# Patient Record
Sex: MALE | Race: WHITE | NOT HISPANIC OR LATINO | Employment: OTHER | URBAN - METROPOLITAN AREA
[De-identification: names, ages, dates, MRNs, and addresses within clinical notes are randomized per-mention and may not be internally consistent; named-entity substitution may affect disease eponyms.]

---

## 2023-08-02 LAB — PROSTATE SPECIFIC AG (NG/ML) IN SER/PLAS: 7.75 NG/ML (ref 0–4)

## 2023-08-20 DIAGNOSIS — K21.9 GASTRO-ESOPHAGEAL REFLUX DISEASE WITHOUT ESOPHAGITIS: ICD-10-CM

## 2023-08-21 RX ORDER — PANTOPRAZOLE SODIUM 40 MG/1
40 TABLET, DELAYED RELEASE ORAL DAILY
Qty: 90 TABLET | Refills: 1 | Status: SHIPPED | OUTPATIENT
Start: 2023-08-21 | End: 2024-05-20 | Stop reason: SDUPTHER

## 2023-09-07 ENCOUNTER — TELEPHONE (OUTPATIENT)
Dept: PRIMARY CARE | Facility: CLINIC | Age: 48
End: 2023-09-07
Payer: COMMERCIAL

## 2023-09-07 ENCOUNTER — LAB (OUTPATIENT)
Dept: LAB | Facility: LAB | Age: 48
End: 2023-09-07
Payer: COMMERCIAL

## 2023-09-07 DIAGNOSIS — R30.0 DYSURIA: ICD-10-CM

## 2023-09-07 DIAGNOSIS — R30.0 DYSURIA: Primary | ICD-10-CM

## 2023-09-07 LAB
APPEARANCE, URINE: CLEAR
BILIRUBIN, URINE: NEGATIVE
BLOOD, URINE: NEGATIVE
COLOR, URINE: YELLOW
GLUCOSE, URINE: NEGATIVE MG/DL
KETONES, URINE: NEGATIVE MG/DL
LEUKOCYTE ESTERASE, URINE: NEGATIVE
NITRITE, URINE: NEGATIVE
PH, URINE: 5 (ref 5–8)
PROTEIN, URINE: NEGATIVE MG/DL
SPECIFIC GRAVITY, URINE: 1.02 (ref 1–1.03)
UROBILINOGEN, URINE: <2 MG/DL (ref 0–1.9)

## 2023-09-07 PROCEDURE — 81003 URINALYSIS AUTO W/O SCOPE: CPT

## 2023-09-07 PROCEDURE — 87086 URINE CULTURE/COLONY COUNT: CPT

## 2023-09-07 NOTE — TELEPHONE ENCOUNTER
Patient is having a burning sensation during urination. Should the patient see a urologist or can something be called in for the patient?

## 2023-09-10 LAB — URINE CULTURE: NORMAL

## 2023-09-26 ENCOUNTER — LAB (OUTPATIENT)
Dept: LAB | Facility: LAB | Age: 48
End: 2023-09-26
Payer: COMMERCIAL

## 2023-09-26 ENCOUNTER — OFFICE VISIT (OUTPATIENT)
Dept: PRIMARY CARE | Facility: CLINIC | Age: 48
End: 2023-09-26
Payer: COMMERCIAL

## 2023-09-26 VITALS
HEART RATE: 68 BPM | RESPIRATION RATE: 16 BRPM | BODY MASS INDEX: 27.52 KG/M2 | TEMPERATURE: 96.9 F | OXYGEN SATURATION: 99 % | HEIGHT: 76 IN | DIASTOLIC BLOOD PRESSURE: 80 MMHG | SYSTOLIC BLOOD PRESSURE: 138 MMHG | WEIGHT: 226 LBS

## 2023-09-26 DIAGNOSIS — Z00.00 HEALTHCARE MAINTENANCE: ICD-10-CM

## 2023-09-26 DIAGNOSIS — Z00.00 HEALTH CARE MAINTENANCE: Primary | ICD-10-CM

## 2023-09-26 PROBLEM — N13.8 BPH WITH OBSTRUCTION/LOWER URINARY TRACT SYMPTOMS: Status: ACTIVE | Noted: 2023-09-26

## 2023-09-26 PROBLEM — M99.09 SEGMENTAL AND SOMATIC DYSFUNCTION: Status: ACTIVE | Noted: 2023-09-26

## 2023-09-26 PROBLEM — R44.8 FACIAL PRESSURE: Status: ACTIVE | Noted: 2023-09-26

## 2023-09-26 PROBLEM — M25.569 KNEE PAIN: Status: ACTIVE | Noted: 2023-09-26

## 2023-09-26 PROBLEM — I83.90 VARICOSE VEIN OF LEG: Status: ACTIVE | Noted: 2023-09-26

## 2023-09-26 PROBLEM — K21.9 GERD (GASTROESOPHAGEAL REFLUX DISEASE): Status: ACTIVE | Noted: 2023-09-26

## 2023-09-26 PROBLEM — J32.0 CHRONIC MAXILLARY SINUSITIS: Status: ACTIVE | Noted: 2023-09-26

## 2023-09-26 PROBLEM — M76.31 ILIOTIBIAL BAND TENDINITIS OF RIGHT SIDE: Status: ACTIVE | Noted: 2023-09-26

## 2023-09-26 PROBLEM — R61 EXCESSIVE SWEATING: Status: ACTIVE | Noted: 2023-09-26

## 2023-09-26 PROBLEM — J32.2 CHRONIC ETHMOIDAL SINUSITIS: Status: ACTIVE | Noted: 2023-09-26

## 2023-09-26 PROBLEM — M24.559 HIP FLEXOR TENDON TIGHTNESS: Status: ACTIVE | Noted: 2023-09-26

## 2023-09-26 PROBLEM — R97.20 ELEVATED PROSTATE SPECIFIC ANTIGEN (PSA): Status: ACTIVE | Noted: 2023-09-26

## 2023-09-26 PROBLEM — M54.2 CERVICALGIA: Status: ACTIVE | Noted: 2023-09-26

## 2023-09-26 PROBLEM — M77.8 TENDINITIS OF EXTENSOR TENDON OF RIGHT HAND: Status: ACTIVE | Noted: 2023-09-26

## 2023-09-26 PROBLEM — M79.10 MYALGIA: Status: ACTIVE | Noted: 2023-09-26

## 2023-09-26 PROBLEM — M47.816 LUMBAR SPONDYLOSIS: Status: ACTIVE | Noted: 2023-09-26

## 2023-09-26 PROBLEM — E78.5 HYPERLIPIDEMIA: Status: ACTIVE | Noted: 2023-09-26

## 2023-09-26 PROBLEM — N40.1 BPH WITH OBSTRUCTION/LOWER URINARY TRACT SYMPTOMS: Status: ACTIVE | Noted: 2023-09-26

## 2023-09-26 PROBLEM — R07.81 RIB PAIN: Status: ACTIVE | Noted: 2023-09-26

## 2023-09-26 PROBLEM — M25.651 STIFFNESS OF RIGHT HIP, NOT ELSEWHERE CLASSIFIED: Status: ACTIVE | Noted: 2023-09-26

## 2023-09-26 PROBLEM — M54.50 LOW BACK PAIN: Status: ACTIVE | Noted: 2023-09-26

## 2023-09-26 PROBLEM — M89.9 SCAPULAR DYSFUNCTION: Status: ACTIVE | Noted: 2023-09-26

## 2023-09-26 PROBLEM — M79.9 POSTURAL STRAIN: Status: ACTIVE | Noted: 2023-09-26

## 2023-09-26 PROBLEM — M54.6 THORACIC SPINE PAIN: Status: ACTIVE | Noted: 2023-09-26

## 2023-09-26 PROBLEM — R79.89 LOW TESTOSTERONE: Status: ACTIVE | Noted: 2023-09-26

## 2023-09-26 LAB
ALANINE AMINOTRANSFERASE (SGPT) (U/L) IN SER/PLAS: 33 U/L (ref 10–52)
ALBUMIN (G/DL) IN SER/PLAS: 4.5 G/DL (ref 3.4–5)
ALKALINE PHOSPHATASE (U/L) IN SER/PLAS: 43 U/L (ref 33–120)
ANION GAP IN SER/PLAS: 9 MMOL/L (ref 10–20)
ASPARTATE AMINOTRANSFERASE (SGOT) (U/L) IN SER/PLAS: 26 U/L (ref 9–39)
BASOPHILS (10*3/UL) IN BLOOD BY AUTOMATED COUNT: 0.04 X10E9/L (ref 0–0.1)
BASOPHILS/100 LEUKOCYTES IN BLOOD BY AUTOMATED COUNT: 0.7 % (ref 0–2)
BILIRUBIN TOTAL (MG/DL) IN SER/PLAS: 1 MG/DL (ref 0–1.2)
CALCIUM (MG/DL) IN SER/PLAS: 9 MG/DL (ref 8.6–10.3)
CARBON DIOXIDE, TOTAL (MMOL/L) IN SER/PLAS: 31 MMOL/L (ref 21–32)
CHLORIDE (MMOL/L) IN SER/PLAS: 103 MMOL/L (ref 98–107)
CHOLESTEROL (MG/DL) IN SER/PLAS: 250 MG/DL (ref 0–199)
CHOLESTEROL IN HDL (MG/DL) IN SER/PLAS: 62.3 MG/DL
CHOLESTEROL/HDL RATIO: 4
CREATININE (MG/DL) IN SER/PLAS: 1.08 MG/DL (ref 0.5–1.3)
EOSINOPHILS (10*3/UL) IN BLOOD BY AUTOMATED COUNT: 0.12 X10E9/L (ref 0–0.7)
EOSINOPHILS/100 LEUKOCYTES IN BLOOD BY AUTOMATED COUNT: 2 % (ref 0–6)
ERYTHROCYTE DISTRIBUTION WIDTH (RATIO) BY AUTOMATED COUNT: 12.5 % (ref 11.5–14.5)
ERYTHROCYTE MEAN CORPUSCULAR HEMOGLOBIN CONCENTRATION (G/DL) BY AUTOMATED: 32.6 G/DL (ref 32–36)
ERYTHROCYTE MEAN CORPUSCULAR VOLUME (FL) BY AUTOMATED COUNT: 97 FL (ref 80–100)
ERYTHROCYTES (10*6/UL) IN BLOOD BY AUTOMATED COUNT: 5 X10E12/L (ref 4.5–5.9)
GFR MALE: 83 ML/MIN/1.73M2
GLUCOSE (MG/DL) IN SER/PLAS: 93 MG/DL (ref 74–99)
HEMATOCRIT (%) IN BLOOD BY AUTOMATED COUNT: 48.4 % (ref 41–52)
HEMOGLOBIN (G/DL) IN BLOOD: 15.8 G/DL (ref 13.5–17.5)
IMMATURE GRANULOCYTES/100 LEUKOCYTES IN BLOOD BY AUTOMATED COUNT: 0.3 % (ref 0–0.9)
LDL: 177 MG/DL (ref 0–99)
LEUKOCYTES (10*3/UL) IN BLOOD BY AUTOMATED COUNT: 5.9 X10E9/L (ref 4.4–11.3)
LYMPHOCYTES (10*3/UL) IN BLOOD BY AUTOMATED COUNT: 1.61 X10E9/L (ref 1.2–4.8)
LYMPHOCYTES/100 LEUKOCYTES IN BLOOD BY AUTOMATED COUNT: 27.1 % (ref 13–44)
MONOCYTES (10*3/UL) IN BLOOD BY AUTOMATED COUNT: 0.47 X10E9/L (ref 0.1–1)
MONOCYTES/100 LEUKOCYTES IN BLOOD BY AUTOMATED COUNT: 7.9 % (ref 2–10)
NEUTROPHILS (10*3/UL) IN BLOOD BY AUTOMATED COUNT: 3.68 X10E9/L (ref 1.2–7.7)
NEUTROPHILS/100 LEUKOCYTES IN BLOOD BY AUTOMATED COUNT: 62 % (ref 40–80)
PLATELETS (10*3/UL) IN BLOOD AUTOMATED COUNT: 256 X10E9/L (ref 150–450)
POTASSIUM (MMOL/L) IN SER/PLAS: 4.3 MMOL/L (ref 3.5–5.3)
PROTEIN TOTAL: 7.2 G/DL (ref 6.4–8.2)
SODIUM (MMOL/L) IN SER/PLAS: 139 MMOL/L (ref 136–145)
THYROTROPIN (MIU/L) IN SER/PLAS BY DETECTION LIMIT <= 0.05 MIU/L: 1.01 MIU/L (ref 0.44–3.98)
TRIGLYCERIDE (MG/DL) IN SER/PLAS: 54 MG/DL (ref 0–149)
UREA NITROGEN (MG/DL) IN SER/PLAS: 20 MG/DL (ref 6–23)
VLDL: 11 MG/DL (ref 0–40)

## 2023-09-26 PROCEDURE — 84443 ASSAY THYROID STIM HORMONE: CPT

## 2023-09-26 PROCEDURE — 1036F TOBACCO NON-USER: CPT | Performed by: INTERNAL MEDICINE

## 2023-09-26 PROCEDURE — 99396 PREV VISIT EST AGE 40-64: CPT | Performed by: INTERNAL MEDICINE

## 2023-09-26 PROCEDURE — 36415 COLL VENOUS BLD VENIPUNCTURE: CPT

## 2023-09-26 PROCEDURE — 93000 ELECTROCARDIOGRAM COMPLETE: CPT | Performed by: INTERNAL MEDICINE

## 2023-09-26 PROCEDURE — 80061 LIPID PANEL: CPT

## 2023-09-26 PROCEDURE — 80053 COMPREHEN METABOLIC PANEL: CPT

## 2023-09-26 PROCEDURE — 85025 COMPLETE CBC W/AUTO DIFF WBC: CPT

## 2023-09-26 ASSESSMENT — ENCOUNTER SYMPTOMS
DIARRHEA: 0
ABDOMINAL PAIN: 0
BLOOD IN STOOL: 0
CONSTIPATION: 0
FREQUENCY: 0
ROS GI COMMENTS: POSITIVE FOR HEARTBURN.

## 2023-09-26 ASSESSMENT — PATIENT HEALTH QUESTIONNAIRE - PHQ9
SUM OF ALL RESPONSES TO PHQ9 QUESTIONS 1 AND 2: 0
2. FEELING DOWN, DEPRESSED OR HOPELESS: NOT AT ALL
1. LITTLE INTEREST OR PLEASURE IN DOING THINGS: NOT AT ALL

## 2023-09-26 NOTE — PROGRESS NOTES
Patient here for a physical - declined chaperone     Subjective   Patient ID: Thanh Fulton is a 48 y.o. male who presents for Annual Exam.    The patient believes that the ongoing excessive sweating may be related to eating late at night.  He denies any chest pain or chest pressure.    The patient reports small callouses on the skin.    The patient mentions occasional bilateral tinnitus but denies any hearing impairment.  He believes the symptoms are tolerable for now and does not think Audiometry is necessary as yet.    The patient notes occasional heartburn due to drinking red wine.    The patient follows regularly with a Dentist, and reports poor to average sleep quality.  The patient denies any vision changes, abdominal pain, hematochezia, melena, bowel problems, or significant urinary symptoms.     The patient recently returned from a trip to [place].       Review of Systems   HENT:  Positive for tinnitus. Negative for hearing loss.    Eyes:  Negative for visual disturbance.   Cardiovascular:  Negative for chest pain.   Gastrointestinal:  Negative for abdominal pain, blood in stool, constipation and diarrhea.        Positive for heartburn.   Genitourinary:  Negative for frequency.   Skin:         Positive for skin callouses.     Objective   Physical Exam  Constitutional:       Appearance: Normal appearance.   Neck:      Vascular: No carotid bruit.   Cardiovascular:      Rate and Rhythm: Normal rate and regular rhythm.      Heart sounds: Normal heart sounds.   Pulmonary:      Effort: Pulmonary effort is normal.      Breath sounds: Normal breath sounds.   Abdominal:      General: Bowel sounds are normal.      Palpations: Abdomen is soft.      Tenderness: There is no abdominal tenderness.   Skin:     General: Skin is warm and dry.   Neurological:      General: No focal deficit present.      Mental Status: He is alert and oriented to person, place, and time. Mental status is at baseline.   Psychiatric:          Mood and Affect: Mood normal.         Behavior: Behavior normal.       Assessment/Plan   Problem List Items Addressed This Visit    None  Visit Diagnoses         Codes    Health care maintenance    -  Primary Z00.00    Relevant Orders    ECG 12 lead (Clinic Performed) (Completed)    Healthcare maintenance     Z00.00    Relevant Orders    Lipid panel    Comprehensive metabolic panel    CBC and Auto Differential    Tsh With Reflex To Free T4 If Abnormal            CPE Performed  -  Discussed healthy diet and regular exercise.    -  Physical exam overall unremarkable. Immunizations reviewed and updated accordingly. Healthy lifestyle choices discussed (tobacco avoidance, appropriate alcohol use, avoidance of illicit substances).   -  Patient is wearing seatbelt.   -  Screening lab work ordered as indicated.    -  Age appropriate screening tests reviewed with patient.        EKG unremarkable compared to previous.     IMPRESSION/PLAN:     /80 in the office today.    HLD   - Cholesterol and LDL borderline high per 11/2021 lipid panel, patient advised to continue to lower levels through healthy diet and regular physical activity.      GERD   - symptoms managed with pantoprazole 40mg QD. EGD 5/2022 unremarkable.     Elevated PSA  - Last PSA elevated at 4.58 9/2022.  s/p biopsy 1/2023 showed no malignancy.  Following with  from Urology.    FHx of Heart Disease   - CT Cardiac Score 0 per 11/2022.     Knee Pain   - Xray 9/2022 showed mild osteoarthritis in the right knee.     Excessive Sweating   - Last TSH 11/2021 wnl. Patient mentions symptoms appear when he eats later in the evening, and advised he eat earlier to avoid triggering the condition. Will monitor for now, and advised to call the office if symptoms persist or worsen.    Health Maintenance   - Routine labs ordered including CBC, CMP, and a lipid panel to be completed in the fasting state.  Added TSH.  Last colonoscopy 5/2022, repeat due 5/2027.  Advised the patient to receive his Shingrix series vaccine through the pharmacy, and discussed adverse effects.     Follow up for annual physical examination, call sooner if needed.        Scribe Attestation  By signing my name below, I, Nayeli Sorto   attest that this documentation has been prepared under the direction and in the presence of Dmitriy Balbuena DO.

## 2023-12-07 ENCOUNTER — ALLIED HEALTH (OUTPATIENT)
Dept: INTEGRATIVE MEDICINE | Facility: CLINIC | Age: 48
End: 2023-12-07
Payer: COMMERCIAL

## 2023-12-07 DIAGNOSIS — M53.3 SACRAL BACK PAIN: ICD-10-CM

## 2023-12-07 DIAGNOSIS — M99.05 SEGMENTAL AND SOMATIC DYSFUNCTION OF PELVIC REGION: ICD-10-CM

## 2023-12-07 DIAGNOSIS — M25.551 PAIN OF RIGHT HIP: ICD-10-CM

## 2023-12-07 DIAGNOSIS — M99.04 SEGMENTAL AND SOMATIC DYSFUNCTION OF SACRAL REGION: ICD-10-CM

## 2023-12-07 DIAGNOSIS — M79.10 MYALGIA: ICD-10-CM

## 2023-12-07 DIAGNOSIS — M99.02 SEGMENTAL AND SOMATIC DYSFUNCTION OF THORACIC REGION: Primary | ICD-10-CM

## 2023-12-07 DIAGNOSIS — G89.29 CHRONIC RIGHT-SIDED THORACIC BACK PAIN: ICD-10-CM

## 2023-12-07 DIAGNOSIS — M99.03 SEGMENTAL AND SOMATIC DYSFUNCTION OF LUMBAR REGION: ICD-10-CM

## 2023-12-07 DIAGNOSIS — M79.9 POSTURAL STRAIN: ICD-10-CM

## 2023-12-07 DIAGNOSIS — M54.6 CHRONIC RIGHT-SIDED THORACIC BACK PAIN: ICD-10-CM

## 2023-12-07 PROCEDURE — 98941 CHIROPRACT MANJ 3-4 REGIONS: CPT | Performed by: CHIROPRACTOR

## 2023-12-07 PROCEDURE — 97140 MANUAL THERAPY 1/> REGIONS: CPT | Performed by: CHIROPRACTOR

## 2023-12-07 ASSESSMENT — ENCOUNTER SYMPTOMS
SHORTNESS OF BREATH: 0
DIZZINESS: 0
CONFUSION: 0
APPETITE CHANGE: 0
FEVER: 0
WOUND: 0
NAUSEA: 0
VOMITING: 0
ROS GI COMMENTS: GERD
FATIGUE: 0
DYSURIA: 0
WEAKNESS: 0

## 2023-12-07 NOTE — PROGRESS NOTES
Subjective   Patient ID: Thanh Fulton is a 48 y.o. male who presents today for the examination and treatment of pain involving their back pain.    This is visit 5/20 of the calendar year.    Fall risk: None. No falls in the last 6 months.     HPI -has not been in for treatment in several months.  Since then he has traveled for 3 separate trips.  He also reports that he has been extremely busy with work but is trying to reprioritize his health.  He recognizes that he has not been exercising or stretching is much as he should.  Today he would like to focus treatment on the right mid scapular region and also check his lower back and hips.  He will have right-sided IT band pain most notably after sleeping and likely due to sleeping postures.    Patient describes the pain as achiness, stiffness, tightness, and nagging, and rates the current pain 6 out of 10 (10 being the worst).     Last treatment visit 4/24/23: The right thoracic pain has been significantly improved. However he is a little more cautious and has not been doing benchpress as often or as intensely. Today he is having some soreness and tightness in the right lateral hip (TTB) and the right SI joint. We will focus on soft tissue treatment here and check full spine for any additional restrictions or imbalances.     01/06/2023:Thanh presents today for the first time since March 2022. There are no current acute pain issues but he is feeling some increased stiffness in the lower back especially when standing for long period of time such as when he is doing dishes. He also is complaining of soreness in the cervical thoracic region especially when at his computer desk for prolonged period of time as well. We discussed some adjustments to his posture and also showed him the standing downward dog stretch for opening the chest shoulders and encouraging thoracic extension. Today we will check full spine for any restrictions or stiffness.     INITIAL ANNAMARIA 09/01/20:  Thanh presents today for his chief complaint of middle to low back pain of about 1 year in duration. He rates the pain as a 3 out of 10 (10 being the worst imaginable pain). He notes that the pain isn't intense and he describes it more as a discomfort. He presented to his PCP who referred him to Dr. Mayo a couple of months ago with his back pain complaint and Dr. Mayo sent him for an x-ray that was negative. He was then sent to physical therapy. He notes that the physical therapy has helped, however, he is still having some stiffness, tightness and soreness in the muscles. He notes that physical therapy then sent him to get massage therapy. He saw Andrea, massage therapist, last week. He notes that the massage helped relief the pain; stretching has also helped.      Thanh notes that he is pretty active and is planning to play basketball about twice a week moving forward. He owns an insurance company and he notes that he does a good job at standing up and walking while working on his computer.      Review of Systems   Constitutional:  Negative for appetite change, fatigue and fever.   HENT:  Negative for congestion and hearing loss.    Eyes:  Negative for visual disturbance.   Respiratory:  Negative for shortness of breath.    Cardiovascular:  Negative for chest pain.   Gastrointestinal:  Negative for nausea and vomiting.        GERD   Genitourinary:  Negative for dysuria.        BPH with obstruction/lower urinary tract symptoms (elevated PSA)   Skin:  Negative for rash and wound.   Neurological:  Negative for dizziness and weakness.   Psychiatric/Behavioral:  Negative for confusion.    All other systems reviewed and are negative.    Objective   Observation : normal gait and normal posture. Tall.     Physical Exam  Constitutional:       General: He is not in acute distress.  Musculoskeletal:         General: No signs of injury.   Neurological:      Mental Status: He is oriented to person, place, and time.       Sensory: Sensation is intact.      Motor: Motor function is intact.      Coordination: Coordination is intact.      Gait: Gait is intact.   Psychiatric:         Mood and Affect: Mood normal.     Examination findings (palpation & ROM): Tenderness, hypertonicity and trigger points palpated throughout the right thoracic paraspinals, right rhomboids, right middle and lower trapezius, bilateral lumbar paraspinals and gluteals.  Right hip flexor tightness and right IT band tightness comparatively to the left.    Segmental joint dysfunction was identified in the following areas using motion palpation and/or pain provocation assessment:  Cervical: ---  Thoracic: T4 -T8, T11 andT12.   Lumbar: L1-L5.   Sacrum: L5/SI and Right SI.     Today's treatment:  Performed spinal manipulation to the regions of segmental dysfunction identified on examination using age-appropriate and injury specific force, and manual diversified technique. Technique Used: diversified CMT~and~pelvic drop table technique.       Manual Therapy (75718), Time In: 10:20 AM, Time Out: 10:35 AM, 1 Units. Ischemic compression, IASTM/Graston and Soft-tissue manipulation. Brief supine MFR to the neck, upper back and shoulders. Prone and seated IC, MFR and IASTM to R rhomboids, thoracic/lumbar paraspinals, QL and lats.     It was advised that he uses foam roller for thoracic mobility and also try sleeping with a pillow between his knees for better sacral positioning.    Treatment Plan:   The patient and I discussed the risks and benefits of chiropractic care. Based on the patient's subjective complaints along with the examination findings, it is advised that a course of chiropractic treatment by initiated. Consent for care was given both written and orally by the patient. The patient tolerated today's treatment with little or no additional discomfort and was instructed to contact the office for questions or concerns.     Treatment Frequency: Will see/treat patient  every 2-4 weeks as therapeutic benefit is sustained, then frequency will be reduced to as needed for symptom management or as needed for acute flare-ups/exacerbation.     Please note: Voice-to-text software was used when completing this note.  While the note was proofread, portions may include grammatical errors.  Please contact me with any questions/concerns as it relates to these types of errors.

## 2024-02-20 ENCOUNTER — OFFICE VISIT (OUTPATIENT)
Dept: DERMATOLOGY | Facility: CLINIC | Age: 49
End: 2024-02-20
Payer: COMMERCIAL

## 2024-02-20 DIAGNOSIS — D18.01 HEMANGIOMA OF SKIN: ICD-10-CM

## 2024-02-20 DIAGNOSIS — D22.9 MULTIPLE BENIGN NEVI: ICD-10-CM

## 2024-02-20 DIAGNOSIS — L72.0 EPIDERMAL CYST: ICD-10-CM

## 2024-02-20 DIAGNOSIS — L73.9 FOLLICULITIS: Primary | ICD-10-CM

## 2024-02-20 DIAGNOSIS — L57.8 DIFFUSE PHOTODAMAGE OF SKIN: ICD-10-CM

## 2024-02-20 DIAGNOSIS — L81.4 LENTIGO: ICD-10-CM

## 2024-02-20 PROCEDURE — 1036F TOBACCO NON-USER: CPT | Performed by: STUDENT IN AN ORGANIZED HEALTH CARE EDUCATION/TRAINING PROGRAM

## 2024-02-20 PROCEDURE — 99204 OFFICE O/P NEW MOD 45 MIN: CPT | Performed by: STUDENT IN AN ORGANIZED HEALTH CARE EDUCATION/TRAINING PROGRAM

## 2024-02-20 RX ORDER — CLINDAMYCIN PHOSPHATE 10 UG/ML
LOTION TOPICAL DAILY
Qty: 60 ML | Refills: 11 | Status: SHIPPED | OUTPATIENT
Start: 2024-02-20 | End: 2025-02-19

## 2024-02-20 NOTE — PROGRESS NOTES
Subjective   Thanh Fulton is a 48 y.o. male who presents for the following: Skin Check (LV: 3/2/23 with dermatologist Dr. Gardenia Worthy. [facility]. Concerned with raised spot on upper back.) and Suspicious Skin Lesion (Two raised areas on back of scalp that have been present for approximately one year. Concerned with raised areas around mouth.)    Skin Cancer History  No personal history of skin cancer.    Family History of Skin Cancer  No family history of skin cancer.      Objective   Well appearing patient in no apparent distress; mood and affect are within normal limits.    A full examination was performed including scalp, head, eyes, ears, nose, lips, neck, chest, axillae, abdomen, back, buttocks, bilateral upper extremities, bilateral lower extremities, hands, feet, fingers, toes, fingernails, and toenails. All findings within normal limits unless otherwise noted below.    Mottled pigmentation and telangiectasias consistent with sun damage    Scattered cherry-red papule(s).    Scattered tan macules in sun-exposed areas.    Scattered, uniform and benign-appearing, regular brown melanocytic papules and macules.    Neck - Anterior, Scalp  Follicularly-based erythematous papules and pustules.    Mid Back  Round cystic papule with puctum      Assessment/Plan   Folliculitis  Neck - Anterior; Scalp    Reassured of benign, yet possibly chronic and intermittently flaring nature of condition   -Advised to start BPO 10% wash as body wash daily, followed by Clindamycin 1% lotion to affected areas. May d/c Clindamycin once improved, but would continue BPO wash daily as preventative measure -Patient verbalized understanding and agreement with plan.      clindamycin (Cleocin T) 1 % lotion - Neck - Anterior, Scalp  Apply topically once daily.    Diffuse photodamage of skin    Encouraged sun protection with opting for shade when feasible, wearing sun protective clothing, and/or using sunscreen SPF 30+ daily.     Related  Procedures  Follow Up In Dermatology - Established Patient    Epidermal cyst  Mid Back    Reassured of benign nature of lesion. If becomes inflamed, painful, we can pursue excision as desired.   He chose to defer for now     Hemangioma of skin    Reassured of benign nature of lesions    Lentigo    Reassured of benign nature of lesions    Multiple benign nevi    Reassured of benign nature of lesions

## 2024-02-20 NOTE — PATIENT INSTRUCTIONS

## 2024-05-20 DIAGNOSIS — K21.9 GASTRO-ESOPHAGEAL REFLUX DISEASE WITHOUT ESOPHAGITIS: ICD-10-CM

## 2024-05-20 RX ORDER — PANTOPRAZOLE SODIUM 40 MG/1
40 TABLET, DELAYED RELEASE ORAL DAILY
Qty: 90 TABLET | Refills: 1 | Status: SHIPPED | OUTPATIENT
Start: 2024-05-20

## 2024-05-21 ENCOUNTER — OFFICE VISIT (OUTPATIENT)
Dept: OTOLARYNGOLOGY | Facility: CLINIC | Age: 49
End: 2024-05-21
Payer: COMMERCIAL

## 2024-05-21 VITALS — WEIGHT: 221.9 LBS | BODY MASS INDEX: 27.59 KG/M2 | HEIGHT: 75 IN

## 2024-05-21 DIAGNOSIS — J32.8 OTHER CHRONIC SINUSITIS: Primary | ICD-10-CM

## 2024-05-21 DIAGNOSIS — H69.90 DYSFUNCTION OF EUSTACHIAN TUBE, UNSPECIFIED LATERALITY: ICD-10-CM

## 2024-05-21 DIAGNOSIS — J33.9 NASAL POLYP: ICD-10-CM

## 2024-05-21 PROCEDURE — 1036F TOBACCO NON-USER: CPT | Performed by: OTOLARYNGOLOGY

## 2024-05-21 PROCEDURE — 99213 OFFICE O/P EST LOW 20 MIN: CPT | Performed by: OTOLARYNGOLOGY

## 2024-05-21 RX ORDER — MOMETASONE FUROATE 100 %
POWDER (GRAM) MISCELLANEOUS
Qty: 60 G | Refills: 3 | Status: SHIPPED | OUTPATIENT
Start: 2024-05-21

## 2024-05-21 ASSESSMENT — PAIN SCALES - GENERAL: PAINLEVEL: 0-NO PAIN

## 2024-05-21 NOTE — PROGRESS NOTES
"Chief Complaint:    Chronic sinusitis    Referring Provider: No ref. provider found    History of Present Illness:    Thanh Fulton is a 48 y.o. male, presenting for evaluation of chronic sinusitis.  He is a patient of my partner Dr. Gruber who previously performed surgery for chronic rhinosinusitis with nasal polyposis.  He states that lately he has been having some issues with his ears where he intermittently has fullness and inability to equalize pressure.  He states that he noticed this when he was flying recently.  He has an upcoming trip to [place] in June.  He also has intermittent nasal obstruction has a sensation of mucus flying down the back of his throat and neck.  He is not currently taking any sinonasal medications.  No other pertinent history.    ?  Review of Systems:    Review of symptoms was negative except for those stated including Cardiopulmonary, Genitourinary, Gastrointestinal, Psychological, Sleep pattern, Endocrine, Eyes, Neurologic, Musculoskeletal, Skin, Hematologic/Lymphatic and Allergic/Immunologic.     Medical History:    I have reviewed the patient's updated past medical history, surgical history, family history, social history, as well as current medications and allergies as of 5/21/2024. Changes to these items have been updated and marked as reviewed in the electronic medical record.    Physical Exam:    Vitals:  height is 1.905 m (6' 3\") and weight is 101 kg (221 lb 14.4 oz).   General: Patient doing well overall and is in no apparent distress.  Psych: Pleasant affect, and answers questions appropriately.  Head & Face: Symmetric facial movements  Eyes: Pupils equal, round, reactive.  Extraocular movements intact without gaze restrictions or nystagmus. No epiphora.  Ears:  External auditory canals are normal.  Tympanic membranes are clear.  No middle ear effusion is seen.  All middle ear landmarks are normal.  Nose: Anterior rhinoscopy revealed normal sinonasal mucosa. More " posterior areas of the nasal cavity could not be completely examined.  Oral Cavity/Oropharynx:  Without lesions or masses to visual exam.  Neck: Supple without lymphadenopathy.  Lungs: Non-labored, and without evidence of stridor.  Cardiac: Pulses are strong, well-perfused.  Extremities: Without gross evidence of clubbing, cyanosis, or edema.  Neuro: Cranial nerves II-XII grossly intact; Intact facial movements.    Procedure:  Rigid nasal endoscopy (35891)  Pre-procedure diagnosis/Indication for procedure:  To evaluate areas not visualized on anterior rhinoscopy   Anesthesia:  None  Description:  A 0-degree 3-mm rigid nasal endoscope was used to examine the left and right nasal cavities.  The nasal valve areas were examined for abnormalities or collapse.  The inferior and middle turbinates were evaluated.  The middle and superior meatuses, and the sphenoethmoid recesses were examined and inspected for mucopurulence and polyps. Once the endoscope was withdrawn, the patient was noted to have tolerated the procedure well without complications and was returned to ambulatory status.    Findings:    There is polyposis within the ethmoid cavity bilaterally.  There is also thick yellow drainage collecting in the nasopharynx adjacent to both eustachian tubes.  The source of the drainage appears to be the ethmoid cavity bilaterally.  He tolerated the procedure well.      Assessment:     Thanh Fulton is a 48 y.o. male with chronic sinusitis with nasal polyposis and eustachian tube dysfunction.    Plan:      I recommend that he initiate mometasone rinses twice daily bilaterally.  He can use these for 6 to 8 weeks and then we will see him in follow-up to check his progress.  I also advised him to use an oral and/or topical decongestant upon takeoff and landing.  He is in agreement with this plan and will follow-up with us in August when he returns from [place].      [place]bania Barros MD, M.Eng.   of  Otolaryngology - Head & Neck Surgery  Division of Rhinology and Endoscopic Skull Base Surgery  [facility]

## 2024-06-18 ENCOUNTER — APPOINTMENT (OUTPATIENT)
Dept: OTOLARYNGOLOGY | Facility: CLINIC | Age: 49
End: 2024-06-18
Payer: COMMERCIAL

## 2024-08-12 NOTE — PROGRESS NOTES
Chief Complaint:  Follow-up    Referring Provider: No ref. provider found    History of Present Illness:    Thanh Fulton is a 49 y.o. male, presenting for follow-up for chronic sinusitis.  He just recently returned from [place] and states while he was abroad he was not able to use his irrigations or other medications.  He states that despite this he is actually felt somewhat better.  He did not have any issues with pressure equalization when in the airplane.  He presents today with still some lingering sinusitis symptoms but otherwise is feeling well.  ?  Review of Systems:    Review of symptoms was negative except for those stated including Cardiopulmonary, Genitourinary, Gastrointestinal, Psychological, Sleep pattern, Endocrine, Eyes, Neurologic, Musculoskeletal, Skin, Hematologic/Lymphatic and Allergic/Immunologic.     Medical History:    I have reviewed the patient's updated past medical history, surgical history, family history, social history, as well as current medications and allergies as of 8/13/2024. Changes to these items have been updated and marked as reviewed in the electronic medical record.    Physical Exam:    Vitals:  vitals were not taken for this visit.   General: Patient doing well overall and is in no apparent distress.  Psych: Pleasant affect, and answers questions appropriately.  Head & Face: Symmetric facial movements  Eyes: Pupils equal, round, reactive.  Extraocular movements intact without gaze restrictions or nystagmus. No epiphora.  Ears:  External auditory canals are normal.  Tympanic membranes are clear.  No middle ear effusion is seen.  All middle ear landmarks are normal.  Nose: Anterior rhinoscopy revealed normal sinonasal mucosa. More posterior areas of the nasal cavity could not be completely examined.  Oral Cavity/Oropharynx:  Without lesions or masses to visual exam.  Neck: Supple without lymphadenopathy.  Lungs: Non-labored, and without evidence of stridor.  Cardiac: Pulses  are strong, well-perfused.  Extremities: Without gross evidence of clubbing, cyanosis, or edema.  Neuro: Cranial nerves II-XII grossly intact; Intact facial movements.    Assessment:     Thanh Fulton is a 49 y.o. male with chronic rhinitis    Plan:      Continue mometasone rinses and follow-up in 2 to 3 months to recheck his symptoms.      Albania Barros MD, M.Eng.   of Otolaryngology - Head & Neck Surgery  Division of Rhinology and Endoscopic Skull Base Surgery  [facility]

## 2024-08-13 ENCOUNTER — APPOINTMENT (OUTPATIENT)
Dept: OTOLARYNGOLOGY | Facility: CLINIC | Age: 49
End: 2024-08-13
Payer: COMMERCIAL

## 2024-08-13 VITALS — BODY MASS INDEX: 28.11 KG/M2 | WEIGHT: 226.1 LBS | HEIGHT: 75 IN

## 2024-08-13 DIAGNOSIS — J33.9 NASAL POLYP: Primary | ICD-10-CM

## 2024-08-13 DIAGNOSIS — H69.90 DYSFUNCTION OF EUSTACHIAN TUBE, UNSPECIFIED LATERALITY: ICD-10-CM

## 2024-08-13 DIAGNOSIS — J32.8 OTHER CHRONIC SINUSITIS: ICD-10-CM

## 2024-08-13 PROCEDURE — 3008F BODY MASS INDEX DOCD: CPT | Performed by: OTOLARYNGOLOGY

## 2024-08-13 PROCEDURE — 99213 OFFICE O/P EST LOW 20 MIN: CPT | Performed by: OTOLARYNGOLOGY

## 2024-08-13 ASSESSMENT — PATIENT HEALTH QUESTIONNAIRE - PHQ9
2. FEELING DOWN, DEPRESSED OR HOPELESS: NOT AT ALL
1. LITTLE INTEREST OR PLEASURE IN DOING THINGS: NOT AT ALL
SUM OF ALL RESPONSES TO PHQ9 QUESTIONS 1 AND 2: 0

## 2024-08-13 ASSESSMENT — PAIN SCALES - GENERAL: PAINLEVEL: 0-NO PAIN

## 2024-09-24 ENCOUNTER — APPOINTMENT (OUTPATIENT)
Dept: OTOLARYNGOLOGY | Facility: CLINIC | Age: 49
End: 2024-09-24
Payer: COMMERCIAL

## 2024-09-27 ENCOUNTER — APPOINTMENT (OUTPATIENT)
Dept: PRIMARY CARE | Facility: CLINIC | Age: 49
End: 2024-09-27
Payer: COMMERCIAL

## 2024-09-27 VITALS
WEIGHT: 225 LBS | TEMPERATURE: 97.7 F | HEART RATE: 59 BPM | RESPIRATION RATE: 16 BRPM | DIASTOLIC BLOOD PRESSURE: 80 MMHG | HEIGHT: 76 IN | OXYGEN SATURATION: 97 % | BODY MASS INDEX: 27.4 KG/M2 | SYSTOLIC BLOOD PRESSURE: 128 MMHG

## 2024-09-27 DIAGNOSIS — Z00.00 HEALTHCARE MAINTENANCE: Primary | ICD-10-CM

## 2024-09-27 PROCEDURE — 3008F BODY MASS INDEX DOCD: CPT | Performed by: INTERNAL MEDICINE

## 2024-09-27 PROCEDURE — 99396 PREV VISIT EST AGE 40-64: CPT | Performed by: INTERNAL MEDICINE

## 2024-09-27 PROCEDURE — 93000 ELECTROCARDIOGRAM COMPLETE: CPT | Performed by: INTERNAL MEDICINE

## 2024-09-27 PROCEDURE — 1036F TOBACCO NON-USER: CPT | Performed by: INTERNAL MEDICINE

## 2024-09-27 ASSESSMENT — ENCOUNTER SYMPTOMS
ABDOMINAL PAIN: 0
BLOOD IN STOOL: 0
CONSTIPATION: 0
TROUBLE SWALLOWING: 0
SLEEP DISTURBANCE: 1
DIARRHEA: 0

## 2024-09-27 NOTE — PROGRESS NOTES
Patient here for a physical     Subjective   Patient ID: Thanh Fulton is a 49 y.o. male who presents for Annual Exam.    The patient notes that nocturia has improved and occurs only once each evening.  The last PSA was elevated at 7.75 in 8/2023.    The patient is following with  from Otolaryngology for chronic sinusitis.  He has been using mometasone rinses, and finds that this is helping.    The patient suspects mild hearing impairment, and inquires if Audiometry is necessary.  He wears prescription lenses, but denies any vision changes.    The patient reports mild sleep disturbance as compared to baseline, since returning from [place] in early 8/2024.      The patient denies any dysphagia, abdominal pain, hematochezia, melena, or bowel problems.       Review of Systems   HENT:  Positive for hearing loss. Negative for trouble swallowing.    Eyes:  Negative for visual disturbance.   Gastrointestinal:  Negative for abdominal pain, blood in stool, constipation and diarrhea.   Genitourinary:         Positive for nocturia of one time per evening.   Psychiatric/Behavioral:  Positive for sleep disturbance.        Objective   Physical Exam  Constitutional:       Appearance: Normal appearance.   Neck:      Vascular: No carotid bruit.   Cardiovascular:      Rate and Rhythm: Normal rate and regular rhythm.      Heart sounds: Normal heart sounds.   Pulmonary:      Effort: Pulmonary effort is normal.      Breath sounds: Normal breath sounds.   Abdominal:      General: Bowel sounds are normal.      Palpations: Abdomen is soft.      Tenderness: There is no abdominal tenderness.   Skin:     General: Skin is warm and dry.   Neurological:      General: No focal deficit present.      Mental Status: He is alert and oriented to person, place, and time. Mental status is at baseline.   Psychiatric:         Mood and Affect: Mood normal.         Behavior: Behavior normal.         Assessment/Plan   Problem List Items  Addressed This Visit    None  Visit Diagnoses         Codes    Healthcare maintenance    -  Primary Z00.00    Relevant Orders    Lipid panel    Comprehensive metabolic panel    CBC    PSA            CPE Performed  -  Discussed healthy diet and regular exercise.    -  Physical exam overall unremarkable. Immunizations reviewed and updated accordingly. Healthy lifestyle choices discussed (tobacco avoidance, appropriate alcohol use, avoidance of illicit substances).   -  Patient is wearing seatbelt.   -  Screening lab work ordered as indicated.    -  Age appropriate screening tests reviewed with patient.        IMPRESSION/PLAN:     Sleep Disturbance  - Advised patient to try Melatonin when he anticipates jet lag prior to the next trip.  Call the clinic if symptoms persist or worsen.     Hearing Impairment  - Patient would like an order for Audiometry next year in 2025.  Would like to hold off until then.    /80 in the office today.     HLD   - Cholesterol and LDL high 9/2023.  Patient advised to continue to lower levels through healthy diet and regular physical activity.      FHx of Heart Disease   - CT Cardiac Score 0 per 11/2022.    GERD   - symptoms managed with pantoprazole 40mg QD. EGD 5/2022 unremarkable.     Elevated PSA  - Last PSA elevated at 7.75 8/2023.  s/p biopsy 1/2023 showed no malignancy.  Following with  from Urology.      Knee Pain   - Xray 9/2022 showed mild osteoarthritis in the right knee.     Excessive Sweating   - Last TSH 11/2021 wnl. Patient mentions symptoms appear when he eats later in the evening, and advised he eat earlier to avoid triggering the condition. Will monitor for now, and advised to call the office if symptoms persist or worsen.     Health Maintenance   - Routine labs ordered including CBC, CMP, and a lipid panel to be completed in the fasting state. Added PSA. Last PSA wnl 8/2023.  Last colonoscopy 5/2022, repeat due 5/2027.  Advised the patient to receive the  Shingrix series through the pharmacy, and discussed adverse effects.       Follow up for annual physical examination, call sooner if needed.        Scribe Attestation  By signing my name below, I, Nayeli Sorto   attest that this documentation has been prepared under the direction and in the presence of Dmitriy Balbuena DO.   Maru Booth 09/27/24 10:33 AM

## 2024-10-03 ENCOUNTER — LAB (OUTPATIENT)
Dept: LAB | Facility: LAB | Age: 49
End: 2024-10-03
Payer: COMMERCIAL

## 2024-10-03 DIAGNOSIS — Z00.00 HEALTHCARE MAINTENANCE: ICD-10-CM

## 2024-10-03 LAB
ALBUMIN SERPL BCP-MCNC: 4.5 G/DL (ref 3.4–5)
ALP SERPL-CCNC: 53 U/L (ref 33–120)
ALT SERPL W P-5'-P-CCNC: 31 U/L (ref 10–52)
ANION GAP SERPL CALC-SCNC: 13 MMOL/L (ref 10–20)
AST SERPL W P-5'-P-CCNC: 27 U/L (ref 9–39)
BILIRUB SERPL-MCNC: 1.2 MG/DL (ref 0–1.2)
BUN SERPL-MCNC: 14 MG/DL (ref 6–23)
CALCIUM SERPL-MCNC: 9.1 MG/DL (ref 8.6–10.6)
CHLORIDE SERPL-SCNC: 101 MMOL/L (ref 98–107)
CHOLEST SERPL-MCNC: 252 MG/DL (ref 0–199)
CHOLESTEROL/HDL RATIO: 3.8
CO2 SERPL-SCNC: 30 MMOL/L (ref 21–32)
CREAT SERPL-MCNC: 0.95 MG/DL (ref 0.5–1.3)
EGFRCR SERPLBLD CKD-EPI 2021: >90 ML/MIN/1.73M*2
ERYTHROCYTE [DISTWIDTH] IN BLOOD BY AUTOMATED COUNT: 12.1 % (ref 11.5–14.5)
GLUCOSE SERPL-MCNC: 89 MG/DL (ref 74–99)
HCT VFR BLD AUTO: 47.2 % (ref 41–52)
HDLC SERPL-MCNC: 66.9 MG/DL
HGB BLD-MCNC: 15.5 G/DL (ref 13.5–17.5)
LDLC SERPL CALC-MCNC: 164 MG/DL
MCH RBC QN AUTO: 31.7 PG (ref 26–34)
MCHC RBC AUTO-ENTMCNC: 32.8 G/DL (ref 32–36)
MCV RBC AUTO: 97 FL (ref 80–100)
NON HDL CHOLESTEROL: 185 MG/DL (ref 0–149)
NRBC BLD-RTO: 0 /100 WBCS (ref 0–0)
PLATELET # BLD AUTO: 238 X10*3/UL (ref 150–450)
POTASSIUM SERPL-SCNC: 3.9 MMOL/L (ref 3.5–5.3)
PROT SERPL-MCNC: 7.3 G/DL (ref 6.4–8.2)
PSA SERPL-MCNC: 6.46 NG/ML
RBC # BLD AUTO: 4.89 X10*6/UL (ref 4.5–5.9)
SODIUM SERPL-SCNC: 140 MMOL/L (ref 136–145)
TRIGL SERPL-MCNC: 106 MG/DL (ref 0–149)
VLDL: 21 MG/DL (ref 0–40)
WBC # BLD AUTO: 7.1 X10*3/UL (ref 4.4–11.3)

## 2024-10-03 PROCEDURE — 80053 COMPREHEN METABOLIC PANEL: CPT

## 2024-10-03 PROCEDURE — 85027 COMPLETE CBC AUTOMATED: CPT

## 2024-10-03 PROCEDURE — 36415 COLL VENOUS BLD VENIPUNCTURE: CPT

## 2024-10-03 PROCEDURE — 84153 ASSAY OF PSA TOTAL: CPT

## 2024-10-03 PROCEDURE — 80061 LIPID PANEL: CPT

## 2024-10-29 ENCOUNTER — APPOINTMENT (OUTPATIENT)
Dept: OTOLARYNGOLOGY | Facility: CLINIC | Age: 49
End: 2024-10-29
Payer: COMMERCIAL

## 2024-11-29 DIAGNOSIS — K21.9 GASTRO-ESOPHAGEAL REFLUX DISEASE WITHOUT ESOPHAGITIS: ICD-10-CM

## 2024-12-02 RX ORDER — PANTOPRAZOLE SODIUM 40 MG/1
40 TABLET, DELAYED RELEASE ORAL DAILY
Qty: 90 TABLET | Refills: 1 | Status: SHIPPED | OUTPATIENT
Start: 2024-12-02

## 2024-12-10 ENCOUNTER — APPOINTMENT (OUTPATIENT)
Facility: CLINIC | Age: 49
End: 2024-12-10
Payer: COMMERCIAL

## 2024-12-10 VITALS — WEIGHT: 229 LBS | HEIGHT: 75 IN | BODY MASS INDEX: 28.47 KG/M2

## 2024-12-10 DIAGNOSIS — J32.8 OTHER CHRONIC SINUSITIS: Primary | ICD-10-CM

## 2024-12-10 PROCEDURE — 99213 OFFICE O/P EST LOW 20 MIN: CPT | Performed by: OTOLARYNGOLOGY

## 2024-12-10 PROCEDURE — 1036F TOBACCO NON-USER: CPT | Performed by: OTOLARYNGOLOGY

## 2024-12-10 PROCEDURE — 3008F BODY MASS INDEX DOCD: CPT | Performed by: OTOLARYNGOLOGY

## 2024-12-10 ASSESSMENT — PAIN SCALES - GENERAL: PAINLEVEL_OUTOF10: 0-NO PAIN

## 2024-12-10 NOTE — PROGRESS NOTES
"Referring Provider: No ref. provider found    History of Present Illness:    Thanh Fulton is a 49 y.o. male, presenting for follow up for chronic rhinitis.  He states that he has done fine with respect to pressure equalization in his ears and nasal obstruction however he has continued to have forehead and maxillary headaches.  He has been using mometasone rinses fairly consistently since his last appointment with me.  No other new symptoms.    ?  Review of Systems:    Review of symptoms was negative except for those stated including Cardiopulmonary, Genitourinary, Gastrointestinal, Psychological, Sleep pattern, Endocrine, Eyes, Neurologic, Musculoskeletal, Skin, Hematologic/Lymphatic and Allergic/Immunologic.     Medical History:    I have reviewed the patient's updated past medical history, surgical history, family history, social history, as well as current medications and allergies as of 12/10/2024. Changes to these items have been updated and marked as reviewed in the electronic medical record.    Physical Exam:    Vitals:  height is 1.905 m (6' 3\") and weight is 104 kg (229 lb).   General: Patient doing well overall and is in no apparent distress.  Psych: Pleasant affect, and answers questions appropriately.  Head & Face: Symmetric facial movements  Eyes: Pupils equal, round, reactive.  Extraocular movements intact without gaze restrictions or nystagmus. No epiphora.  Ears:  External auditory canals are normal.  Tympanic membranes are clear.  No middle ear effusion is seen.  All middle ear landmarks are normal.  Nose: Anterior rhinoscopy revealed normal sinonasal mucosa. More posterior areas of the nasal cavity could not be completely examined.  Oral Cavity/Oropharynx:  Without lesions or masses to visual exam.  Neck: Supple without lymphadenopathy.  Lungs: Non-labored, and without evidence of stridor.  Cardiac: Pulses are strong, well-perfused.  Extremities: Without gross evidence of clubbing, cyanosis, or " edema.  Neuro: Cranial nerves II-XII grossly intact; Intact facial movements.    Assessment:     Thanh Fulton is a 49 y.o. male with chronic rhinitis, possible chronic sinusitis, partially responsive to mometasone irrigations    Plan:      Sandhya continues to still have headaches despite topical steroids.  Will plan to order CT of the sinuses to determine if there is any significant sinonasal disease.      Albania Barros MD, M.Eng.   of Otolaryngology - Head & Neck Surgery  Division of Rhinology and Endoscopic Skull Base Surgery  [facility]

## 2024-12-23 ENCOUNTER — HOSPITAL ENCOUNTER (OUTPATIENT)
Dept: RADIOLOGY | Facility: CLINIC | Age: 49
Discharge: HOME | End: 2024-12-23
Payer: COMMERCIAL

## 2024-12-23 DIAGNOSIS — J32.8 OTHER CHRONIC SINUSITIS: ICD-10-CM

## 2024-12-23 PROCEDURE — 70486 CT MAXILLOFACIAL W/O DYE: CPT

## 2025-01-08 ENCOUNTER — APPOINTMENT (OUTPATIENT)
Dept: OTOLARYNGOLOGY | Facility: CLINIC | Age: 50
End: 2025-01-08
Payer: COMMERCIAL

## 2025-01-08 DIAGNOSIS — J32.8 OTHER CHRONIC SINUSITIS: ICD-10-CM

## 2025-01-08 DIAGNOSIS — J33.9 NASAL POLYP: Primary | ICD-10-CM

## 2025-01-08 RX ORDER — MOMETASONE FUROATE 100 %
POWDER (GRAM) MISCELLANEOUS
Qty: 60 G | Refills: 3 | Status: SHIPPED | OUTPATIENT
Start: 2025-01-08

## 2025-01-09 NOTE — PROGRESS NOTES
I discussed Tahnh's newest CT with him. It demonstrates bilateral sinonasal disease and extensive mucosal thickening and inflammatory changes within all paranasal sinuses. He remains symptomatic with PND and nasal obstruction. I discussed revision sinus surgery vs. continued medical management with mometasone rinses and he would like to continue rinses for right now. He would like to continue rinses for right now and will follow up with me in 4-6 weeks to determine next steps .

## 2025-02-19 ENCOUNTER — APPOINTMENT (OUTPATIENT)
Dept: DERMATOLOGY | Facility: CLINIC | Age: 50
End: 2025-02-19
Payer: COMMERCIAL

## 2025-02-19 DIAGNOSIS — D18.01 HEMANGIOMA OF SKIN: ICD-10-CM

## 2025-02-19 DIAGNOSIS — L57.8 DIFFUSE PHOTODAMAGE OF SKIN: Primary | ICD-10-CM

## 2025-02-19 DIAGNOSIS — L81.4 LENTIGO: ICD-10-CM

## 2025-02-19 DIAGNOSIS — D22.9 MULTIPLE BENIGN NEVI: ICD-10-CM

## 2025-02-19 DIAGNOSIS — L73.9 FOLLICULITIS: ICD-10-CM

## 2025-02-19 DIAGNOSIS — I78.1 SPIDER ANGIOMA: ICD-10-CM

## 2025-02-19 PROCEDURE — 99214 OFFICE O/P EST MOD 30 MIN: CPT | Performed by: STUDENT IN AN ORGANIZED HEALTH CARE EDUCATION/TRAINING PROGRAM

## 2025-02-19 RX ORDER — CLINDAMYCIN PHOSPHATE 10 UG/ML
LOTION TOPICAL 2 TIMES DAILY
Qty: 60 ML | Refills: 11 | Status: CANCELLED | OUTPATIENT
Start: 2025-02-19 | End: 2026-02-19

## 2025-02-19 RX ORDER — TADALAFIL 10 MG/1
10 TABLET ORAL DAILY PRN
COMMUNITY
Start: 2024-10-10

## 2025-02-19 RX ORDER — CLINDAMYCIN PHOSPHATE 10 MG/G
GEL TOPICAL DAILY
Qty: 60 G | Refills: 5 | Status: SHIPPED | OUTPATIENT
Start: 2025-02-19 | End: 2026-02-19

## 2025-02-19 NOTE — PROGRESS NOTES
Subjective   Thahn Fulton is a 49 y.o. male who presents for the following: Skin Check (LV: 2/20/24: FBSE. No bleeding, changing, painful or itchy lesions today.) and Folliculitis (Present on neck and scalp.  Currently using BPO 10% wash and Clindamycin 1% lotion.)    Skin Cancer History  None    Family History of Skin Cancer  None     The following portions of the chart were reviewed this encounter and updated as appropriate:         Review of Systems: Pertinent items are noted in HPI.    Objective   Well appearing patient in no apparent distress; mood and affect are within normal limits.    A full examination was performed including scalp, head, eyes, ears, nose, lips, neck, chest, axillae, abdomen, back, buttocks, bilateral upper extremities, bilateral lower extremities, hands, feet, fingers, toes, fingernails, and toenails. All findings within normal limits unless otherwise noted below.    Mottled pigmentation and telangiectasias consistent with sun damage    Scattered cherry-red papule(s).    Scattered tan macules in sun-exposed areas.    Scattered, uniform and benign-appearing, regular brown melanocytic papules and macules.    Neck - Anterior, Scalp  Follicularly-based erythematous papules and pustules on occipital scalp    Right Zygomatic Area  Red papule with telangiectasia eminating circumferentially      Assessment/Plan   Diffuse photodamage of skin    Encouraged sun protection with opting for shade when feasible, wearing sun protective clothing, and/or using sunscreen SPF 30+ daily.     Related Procedures  Follow Up In Dermatology - Established Patient  Follow Up In Dermatology - Established Patient    Related Medications  clindamycin (Cleocin T) 1 % gel  Apply topically once daily.    Folliculitis  Neck - Anterior; Scalp    Folliculitis, flaring  Reassured of benign, yet possibly chronic and intermittently flaring nature of condition   He never started antibacterial tx discussed at last  visit  -Advised to start BPO 10% wash as body wash daily, followed by Clindamycin 1% gel 1-2x/day to affected areas. May d/c Clindamycin once improved, but would continue BPO wash daily as preventative measure -Patient verbalized understanding and agreement with plan.      Hemangioma of skin    Reassured of benign nature of lesions    Lentigo    Reassured of benign nature of lesions    Multiple benign nevi    Reassured of benign nature of lesions    Spider angioma  Right Zygomatic Area    Spider angioma  Reassured of benign nature  He asks about treatment  Discussed we can do v-beam laser test spot to the area. May take multiple tx. Approx cost discussed. He states he will think about it and let us know if he'd like to schedule.     Continue to follow up yearly for routine FBSE or earlier prn for new/changing/concerning lesions     Scribe Attestation  By signing my name below, I, Haily Parker LPN , Scribe   attest that this documentation has been prepared under the direction and in the presence of Josiah Butler MD.

## 2025-02-19 NOTE — PATIENT INSTRUCTIONS
"WHAT TO LOOK FOR: ABCDES OF MELANOMA:    A is for Asymmetry  One half of the spot is unlike the other half.    B is for Border  The spot has an irregular, scalloped, or poorly defined border.    C is for Color  The spot has varying colors from one area to the next, such as shades of tan, brown or black, or areas of white, red, or blue.    D is for Diameter  While melanomas are usually greater than 6 millimeters, or about the size of a pencil eraser, when diagnosed, they can be smaller.    E is for Evolving  The spot looks different from the rest or is changing in size, shape, or color.    SUNSCREEN AND SUN PROTECTION    Ultraviolet radiation from the sun is the main cause of skin cancer as well as sun damage (brown spots, wrinkles and more).  Your best protection from the sun is to stay out of the mid-day sun (from 10am-3pm), seek shade, and cover your skin with clothing and hats.  Wear a swim shirt when swimming.  Sunscreen should be used to areas that aren't covered, including lips.    We prefer sunscreens that are SPF 30 or higher.  Sunscreens should be applied liberally and reapplied every 2 hours, more often when swimming or sweating.    If you will be sweating or swimming, choose a sunscreen that is labeled \"Water resistant 80 minutes\".  This is the highest waterproof rating from the FDA.      Use a moisturizer with sunscreen daily to protect your sun-exposed areas such as the face, neck and backs of hands.  Some drugstore brands to try are Neutrogena Healthy Defense Daily Moisturizer (PureScreen) SPF 50 or CeraVe Face Lotion Invisible Zinc SPF 50.  Elta MD products are slightly more expensive and must be ordered through Amazon or the Elta website.  We like their UV Daily or UV Clear.      For body sunscreen when doing outdoor activity, some to try include Sun Bum products, Aveeno Baby Continuous Protection SPF 50 for sensitive skin, Blue Lizard SPF 30+, All Good sport sunscreen SPF 50, or Banana Boat Simply " Protect Sport Sunscreen lotion spf 50.  Sticks, gels, and sprays are also great and can be used for areas of the body that are difficult to cover with lotion.    If you have brown spots such as melasma or lentigenes, choose a tinted sunscreen.  There are ingredients in tinted sunscreens (iron oxide) that do a better job blocking certain types of light that cause brown spots.  We like Elta MD UV Clear tinted or Elta MD UV Daily tinted, which can be ordered on [employer] or from [employer]. You can also try Coola Mineral Face Matte Moisturizer SPF 30 or Australian Gold Botaniacal Suncreen SPF 50 Tinted Face Mineral Lotion.    There are two types of sunscreens: Chemical sunscreens, such as those that contain the ingredients avobenzone and oxybenzone, and Physical sunscreens, such as those that contain Zinc oxide and Titanium dioxide. Chemical sunscreens absorb light and absorb into the skin.  They must be applied 15 minutes before sun exposure.  Physical sunscreens reflect the light and are not absorbed into the skin.  They should be applied 5 minutes before sun exposure.  Some patients worry about the effects of sunscreens that are absorbed into the skin.  If you are worried about this, use the physical (zinc/titanium sunscreens)- look at the label before buying.  There is lots of scientific evidence that sunlight causes cancer, but there is no direct evidence that sunscreens are harmful.  However, the FDA has asked for further study of the chemical sunscreens to make sure they do not have any health effects on humans.

## 2025-02-25 ENCOUNTER — APPOINTMENT (OUTPATIENT)
Facility: CLINIC | Age: 50
End: 2025-02-25
Payer: COMMERCIAL

## 2025-07-16 NOTE — PROGRESS NOTES
Santos Fulton is a 50 y.o. male who presents for the following: Suspicious Skin Lesion (Present on right frontal scalp.  Pt notes the area has grown over the last month, otherwise asymptomatic.)    Skin Cancer History  None    Family History of Skin Cancer  None     The following portions of the chart were reviewed this encounter and updated as appropriate:         Review of Systems: Pertinent items are noted in HPI.    Objective   Well appearing patient in no apparent distress; mood and affect are within normal limits.    A focused examination was performed including head, including the scalp, face, neck, nose, ears, eyelids, and lips. All findings within normal limits unless otherwise noted below.  Right Zygomatic Area  Small yellow-red, lobulated papule with a central yellowish globule and crown vessels on dermoscopy  Right Frontal Scalp  Stuck on verrucous, tan-brown plaque    Assessment/Plan       SEBACEOUS HYPERPLASIA OF FACE  Right Zygomatic Area  Reassured of benign nature of lesion. Previously suspected spider angioma, but dermoscopy today is more suggestive of sebaceous hyperplasia with more prominent yellow globule centrally. Either way, lesion is benign. Offered cautery destruction if bothersome. He declined for now.   SEBORRHEIC KERATOSIS  Right Frontal Scalp  Reassured of benign nature of lesion. Offered destruction if desired. He declined for now          RTC as scheduled 2/25/26 for Haily PADRON LPN, attest that this documentation has been prepared under the direction and in the presence of Josiah Butler MD.

## 2025-07-16 NOTE — PATIENT INSTRUCTIONS
"WHAT TO LOOK FOR: ABCDES OF MELANOMA:    A is for Asymmetry  One half of the spot is unlike the other half.    B is for Border  The spot has an irregular, scalloped, or poorly defined border.    C is for Color  The spot has varying colors from one area to the next, such as shades of tan, brown or black, or areas of white, red, or blue.    D is for Diameter  While melanomas are usually greater than 6 millimeters, or about the size of a pencil eraser, when diagnosed, they can be smaller.    E is for Evolving  The spot looks different from the rest or is changing in size, shape, or color. SUNSCREEN AND SUN PROTECTION    Ultraviolet radiation from the sun is the main cause of skin cancer as well as sun damage (brown spots, wrinkles and more).  Your best protection from the sun is to stay out of the mid-day sun (from 10am-3pm), seek shade, and cover your skin with clothing and hats.  Wear a swim shirt when swimming.  Sunscreen should be used to areas that aren't covered, including lips.    We prefer sunscreens that are SPF 30 or higher.  Sunscreens should be applied liberally and reapplied every 2 hours, more often when swimming or sweating.    If you will be sweating or swimming, choose a sunscreen that is labeled \"Water resistant 80 minutes\".  This is the highest waterproof rating from the FDA.      Use a moisturizer with sunscreen daily to protect your sun-exposed areas such as the face, neck and backs of hands.  Some drugstore brands to try are Neutrogena Healthy Defense Daily Moisturizer (PureScreen) SPF 50 or CeraVe Face Lotion Invisible Zinc SPF 50.  Elta MD products are slightly more expensive and must be ordered through Amazon or the Elta website.  We like their UV Daily or UV Clear.      For body sunscreen when doing outdoor activity, some to try include Sun Bum products, Aveeno Baby Continuous Protection SPF 50 for sensitive skin, Blue Lizard SPF 30+, All Good sport sunscreen SPF 50, or Banana Boat Simply " Protect Sport Sunscreen lotion spf 50.  Sticks, gels, and sprays are also great and can be used for areas of the body that are difficult to cover with lotion.    If you have brown spots such as melasma or lentigenes, choose a tinted sunscreen.  There are ingredients in tinted sunscreens (iron oxide) that do a better job blocking certain types of light that cause brown spots.  We like Elta MD UV Clear tinted or Elta MD UV Daily tinted, which can be ordered on [employer] or from [employer]. You can also try Coola Mineral Face Matte Moisturizer SPF 30 or Australian Gold Botaniacal Suncreen SPF 50 Tinted Face Mineral Lotion.    There are two types of sunscreens: Chemical sunscreens, such as those that contain the ingredients avobenzone and oxybenzone, and Physical sunscreens, such as those that contain Zinc oxide and Titanium dioxide. Chemical sunscreens absorb light and absorb into the skin.  They must be applied 15 minutes before sun exposure.  Physical sunscreens reflect the light and are not absorbed into the skin.  They should be applied 5 minutes before sun exposure.  Some patients worry about the effects of sunscreens that are absorbed into the skin.  If you are worried about this, use the physical (zinc/titanium sunscreens)- look at the label before buying.  There is lots of scientific evidence that sunlight causes cancer, but there is no direct evidence that sunscreens are harmful.  However, the FDA has asked for further study of the chemical sunscreens to make sure they do not have any health effects on humans.

## 2025-07-17 ENCOUNTER — OFFICE VISIT (OUTPATIENT)
Dept: DERMATOLOGY | Facility: CLINIC | Age: 50
End: 2025-07-17
Payer: COMMERCIAL

## 2025-07-17 DIAGNOSIS — L82.1 SEBORRHEIC KERATOSIS: ICD-10-CM

## 2025-07-17 DIAGNOSIS — L73.8 SEBACEOUS HYPERPLASIA OF FACE: Primary | ICD-10-CM

## 2025-07-17 PROCEDURE — 99213 OFFICE O/P EST LOW 20 MIN: CPT | Performed by: STUDENT IN AN ORGANIZED HEALTH CARE EDUCATION/TRAINING PROGRAM

## 2025-07-17 NOTE — Clinical Note
Reassured of benign nature of lesion. Previously suspected spider angioma, but dermoscopy today is more suggestive of sebaceous hyperplasia with more prominent yellow globule centrally. Either way, lesion is benign. Offered cautery destruction if bothersome. He declined for now.

## 2025-07-17 NOTE — Clinical Note
Small yellow-red, lobulated papule with a central yellowish globule and crown vessels on dermoscopy

## 2025-09-29 ENCOUNTER — APPOINTMENT (OUTPATIENT)
Dept: PRIMARY CARE | Facility: CLINIC | Age: 50
End: 2025-09-29
Payer: COMMERCIAL

## 2025-10-21 ENCOUNTER — APPOINTMENT (OUTPATIENT)
Dept: PRIMARY CARE | Facility: CLINIC | Age: 50
End: 2025-10-21
Payer: COMMERCIAL

## 2026-02-25 ENCOUNTER — APPOINTMENT (OUTPATIENT)
Dept: DERMATOLOGY | Facility: CLINIC | Age: 51
End: 2026-02-25
Payer: COMMERCIAL